# Patient Record
Sex: FEMALE | URBAN - METROPOLITAN AREA
[De-identification: names, ages, dates, MRNs, and addresses within clinical notes are randomized per-mention and may not be internally consistent; named-entity substitution may affect disease eponyms.]

---

## 2021-12-21 ENCOUNTER — APPOINTMENT (RX ONLY)
Dept: URBAN - METROPOLITAN AREA CLINIC 156 | Facility: CLINIC | Age: 66
Setting detail: DERMATOLOGY
End: 2021-12-21

## 2021-12-21 DIAGNOSIS — L85.3 XEROSIS CUTIS: ICD-10-CM

## 2021-12-21 DIAGNOSIS — L65.0 TELOGEN EFFLUVIUM: ICD-10-CM

## 2021-12-21 DIAGNOSIS — K14.1 GEOGRAPHIC TONGUE: ICD-10-CM

## 2021-12-21 PROCEDURE — ? COUNSELING

## 2021-12-21 PROCEDURE — ? PRESCRIPTION

## 2021-12-21 PROCEDURE — ? ORDER TESTS

## 2021-12-21 PROCEDURE — 99203 OFFICE O/P NEW LOW 30 MIN: CPT

## 2021-12-21 RX ORDER — CLOTRIMAZOLE 10 MG/1
LOZENGE ORAL; TOPICAL
Qty: 70 | Refills: 0 | Status: ERX | COMMUNITY
Start: 2021-12-21

## 2021-12-21 RX ADMIN — CLOTRIMAZOLE: 10 LOZENGE ORAL; TOPICAL at 00:00

## 2021-12-21 ASSESSMENT — LOCATION SIMPLE DESCRIPTION DERM
LOCATION SIMPLE: LEFT LATERAL TONGUE
LOCATION SIMPLE: RIGHT FOREHEAD
LOCATION SIMPLE: RIGHT ELBOW
LOCATION SIMPLE: LEFT ELBOW

## 2021-12-21 ASSESSMENT — LOCATION DETAILED DESCRIPTION DERM
LOCATION DETAILED: LEFT ELBOW
LOCATION DETAILED: RIGHT ELBOW
LOCATION DETAILED: RIGHT SUPERIOR MEDIAL FOREHEAD
LOCATION DETAILED: LEFT LATERAL TONGUE

## 2021-12-21 ASSESSMENT — LOCATION ZONE DERM
LOCATION ZONE: MUCOUS_MEMBRANE
LOCATION ZONE: FACE
LOCATION ZONE: ARM

## 2021-12-21 NOTE — PROCEDURE: ORDER TESTS
Bill For Surgical Tray: no
Performing Laboratory: [id number]
Lab Facility: [id number]
Billing Type: Third-Party Bill
Expected Date Of Service: 12/23/2021

## 2021-12-30 ENCOUNTER — RX ONLY (OUTPATIENT)
Age: 66
Setting detail: RX ONLY
End: 2021-12-30

## 2021-12-30 RX ORDER — TRIAMCINOLONE ACETONIDE 1 MG/G
PASTE DENTAL
Qty: 5 | Refills: 2 | Status: ERX | COMMUNITY
Start: 2021-12-30

## 2022-03-01 ENCOUNTER — APPOINTMENT (RX ONLY)
Dept: URBAN - METROPOLITAN AREA CLINIC 156 | Facility: CLINIC | Age: 67
Setting detail: DERMATOLOGY
End: 2022-03-01

## 2022-03-01 DIAGNOSIS — K14.1 GEOGRAPHIC TONGUE: ICD-10-CM | Status: INADEQUATELY CONTROLLED

## 2022-03-01 PROCEDURE — ? COUNSELING

## 2022-03-01 PROCEDURE — 99213 OFFICE O/P EST LOW 20 MIN: CPT

## 2022-03-01 PROCEDURE — ? DIAGNOSIS COMMENT

## 2022-03-01 ASSESSMENT — LOCATION SIMPLE DESCRIPTION DERM: LOCATION SIMPLE: LEFT LATERAL TONGUE

## 2022-03-01 ASSESSMENT — LOCATION ZONE DERM: LOCATION ZONE: MUCOUS_MEMBRANE

## 2022-03-01 ASSESSMENT — LOCATION DETAILED DESCRIPTION DERM: LOCATION DETAILED: LEFT LATERAL TONGUE

## 2022-03-01 NOTE — PROCEDURE: DIAGNOSIS COMMENT
Comment: Discussed making at home tacrolimus mouth wash.  She declines right now.  After researching, I found a case of response to children’s Benadryl, swish and spit.  She wants to try this now.  Discussed referral back to periodontist for possible biopsy if fails to resolve.  Consider oral lichen planus.
Render Risk Assessment In Note?: no
Detail Level: Simple

## 2023-03-09 ENCOUNTER — APPOINTMENT (RX ONLY)
Dept: URBAN - METROPOLITAN AREA CLINIC 156 | Facility: CLINIC | Age: 68
Setting detail: DERMATOLOGY
End: 2023-03-09

## 2023-03-09 VITALS — WEIGHT: 190 LBS | HEIGHT: 64 IN

## 2023-03-09 DIAGNOSIS — L71.8 OTHER ROSACEA: ICD-10-CM

## 2023-03-09 DIAGNOSIS — L43.8 OTHER LICHEN PLANUS: ICD-10-CM

## 2023-03-09 DIAGNOSIS — B88.0 OTHER ACARIASIS: ICD-10-CM

## 2023-03-09 PROCEDURE — ? COUNSELING

## 2023-03-09 PROCEDURE — 87220 TISSUE EXAM FOR FUNGI: CPT

## 2023-03-09 PROCEDURE — ? MINERAL OIL PREP

## 2023-03-09 PROCEDURE — ? KOH PREP

## 2023-03-09 PROCEDURE — ? DIAGNOSIS COMMENT

## 2023-03-09 PROCEDURE — ? TREATMENT REGIMEN

## 2023-03-09 PROCEDURE — ? PRESCRIPTION

## 2023-03-09 PROCEDURE — 99214 OFFICE O/P EST MOD 30 MIN: CPT

## 2023-03-09 RX ORDER — IVERMECTIN 3 MG/1
TABLET ORAL
Qty: 10 | Refills: 0 | Status: ERX | COMMUNITY
Start: 2023-03-09

## 2023-03-09 RX ORDER — SULFACETAMIDE SODIUM AND SULFUR 10; 5 MG/G; MG/G
RINSE TOPICAL
Qty: 170.3 | Refills: 3 | Status: ERX | COMMUNITY
Start: 2023-03-09

## 2023-03-09 RX ADMIN — SULFACETAMIDE SODIUM AND SULFUR: 10; 5 RINSE TOPICAL at 00:00

## 2023-03-09 RX ADMIN — IVERMECTIN: 3 TABLET ORAL at 00:00

## 2023-03-09 ASSESSMENT — LOCATION DETAILED DESCRIPTION DERM
LOCATION DETAILED: LEFT CENTRAL MALAR CHEEK
LOCATION DETAILED: LEFT LATERAL TONGUE
LOCATION DETAILED: RIGHT CENTRAL MALAR CHEEK
LOCATION DETAILED: NASAL ROOT
LOCATION DETAILED: RIGHT INFERIOR CENTRAL MALAR CHEEK
LOCATION DETAILED: LEFT INFERIOR CENTRAL MALAR CHEEK
LOCATION DETAILED: INFERIOR MID FOREHEAD

## 2023-03-09 ASSESSMENT — LOCATION ZONE DERM
LOCATION ZONE: NOSE
LOCATION ZONE: FACE
LOCATION ZONE: MUCOUS_MEMBRANE

## 2023-03-09 ASSESSMENT — LOCATION SIMPLE DESCRIPTION DERM
LOCATION SIMPLE: RIGHT CHEEK
LOCATION SIMPLE: NOSE
LOCATION SIMPLE: INFERIOR FOREHEAD
LOCATION SIMPLE: LEFT LATERAL TONGUE
LOCATION SIMPLE: LEFT CHEEK

## 2023-03-09 NOTE — PROCEDURE: TREATMENT REGIMEN
Detail Level: Simple
Initiate Treatment: Otezla:\\nSig: crush 30 mg and put in 16 ounce water. Swish for 30 seconds and swallow. Use twice a day. Keep in refrigerator. Make new solution every week.
Samples Given: Otezla \\nLot: [id number]\\nEspxp : [id number]

## 2023-03-09 NOTE — HPI: RASH
What Type Of Note Output Would You Prefer (Optional)?: Standard Output
Is The Patient Presenting As Previously Scheduled?: Yes
How Severe Is Your Rash?: moderate
Is This A New Presentation, Or A Follow-Up?: Rash
Additional History: Patient states she has rosacea and she feels like her cheek are flared/ broke out

## 2023-03-09 NOTE — PROCEDURE: KOH PREP
Detail Level: Detailed
Cpt Desired: 65205
Showing: demodex
Koh Intro Text (From The.....): A KOH prep was ordered and evaluated from the
Koh Procedure Text (Tissue Harvesting Technique): A 15-blade scalpel was used to scrape the skin. The skin scrapings were placed on a glass slide, covered with a coverslip and a KOH solution was applied.

## 2023-03-09 NOTE — PROCEDURE: DIAGNOSIS COMMENT
Detail Level: Simple
Render Risk Assessment In Note?: no
Comment: Unfortunately periodontist did not biopsy.  I will treat with samples of Otezla, due to low risk and will see how she does with this regimen

## 2023-03-10 RX ORDER — SULFACETAMIDE SODIUM AND SULFUR 10; 5 MG/G; MG/G
RINSE TOPICAL
Qty: 170.3 | Refills: 3 | Status: ERX

## 2023-03-10 RX ORDER — IVERMECTIN 3 MG/1
TABLET ORAL
Qty: 10 | Refills: 0 | Status: ERX

## 2023-03-28 ENCOUNTER — APPOINTMENT (RX ONLY)
Dept: URBAN - METROPOLITAN AREA CLINIC 154 | Facility: CLINIC | Age: 68
Setting detail: DERMATOLOGY
End: 2023-03-28

## 2023-03-28 DIAGNOSIS — Z41.9 ENCOUNTER FOR PROCEDURE FOR PURPOSES OTHER THAN REMEDYING HEALTH STATE, UNSPECIFIED: ICD-10-CM

## 2023-03-28 PROCEDURE — ? COSMETIC CONSULTATION: SKIN CARE PRODUCTS AND SERVICES

## 2023-04-03 ENCOUNTER — APPOINTMENT (RX ONLY)
Dept: URBAN - METROPOLITAN AREA CLINIC 154 | Facility: CLINIC | Age: 68
Setting detail: DERMATOLOGY
End: 2023-04-03

## 2023-04-03 DIAGNOSIS — L71.8 OTHER ROSACEA: ICD-10-CM

## 2023-04-03 PROCEDURE — ? LASER VASCULAR LESION

## 2023-04-03 PROCEDURE — 17107 DSTR CUT VSC PRLF LES10-50SQ: CPT

## 2023-04-03 ASSESSMENT — LOCATION DETAILED DESCRIPTION DERM: LOCATION DETAILED: LEFT INFERIOR CENTRAL MALAR CHEEK

## 2023-04-03 ASSESSMENT — LOCATION SIMPLE DESCRIPTION DERM: LOCATION SIMPLE: LEFT CHEEK

## 2023-04-03 ASSESSMENT — LOCATION ZONE DERM: LOCATION ZONE: FACE

## 2023-04-03 NOTE — PROCEDURE: LASER VASCULAR LESION
Show Topical Anesthesia: Yes
Cryogen Time (Ms): 30
Immediate Endpoint: erythema
Passes: several
Spot Size: 15 mm X 35 mm
Consent: Written consent obtained, risks reviewed including but not limited to crusting, scabbing, blistering, scarring, darker or lighter pigmentary change, incidental hair removal, bruising, and/or incomplete removal.
Laser Type: [place] 50 CO2 Laser
External Cooling Fan Speed: 5
Medical Necessity Clause: This procedure was medically necessary because the lesions that were treated were:
Power (Vang-Leave At Zero If Unwanted): 40
Post-Care Instructions: I reviewed with the patient in detail post-care instructions. Patient should stay away from the sun and wear sun protection until treated areas are fully healed.
Laser Mode: Fractionated
Medical Necessity Information: It is in your best interest to select a reason for this procedure from the list below. All of these items fulfill various CMS LCD requirements except the new and changing color options.
Delay Time (Ms): 20
Detail Level: Zone
Post Procedure Text: Post care reviewed with patient.
Pulse Duration (Usec): 0
Pulse Duration: 20 ms
Anesthesia Type: 1% lidocaine with epinephrine
External Cooling: [place] Cryo 5
Fluence: 15
Energy(Mj-Leave At Zero If Unwanted): 500
Feathering Statement: Following the treatment the wound edges were feathered using 260mJ.
Total Pulses: 52
Wavelength: 10,600nm
Include Z78.9 (Other Specified Conditions Influencing Health Status) As An Associated Diagnosis?: No
Spot Sizes: 3mm
Laser Type: IPL
Fluence Override: 6-15
Square Area Of Lesion: 20

## 2023-04-14 ENCOUNTER — APPOINTMENT (RX ONLY)
Dept: URBAN - METROPOLITAN AREA CLINIC 154 | Facility: CLINIC | Age: 68
Setting detail: DERMATOLOGY
End: 2023-04-14

## 2023-04-14 DIAGNOSIS — Z41.9 ENCOUNTER FOR PROCEDURE FOR PURPOSES OTHER THAN REMEDYING HEALTH STATE, UNSPECIFIED: ICD-10-CM

## 2023-04-14 PROCEDURE — ? SIGNATURE HYDRAFACIAL

## 2023-04-14 NOTE — PROCEDURE: SIGNATURE HYDRAFACIAL
Procedure: Extend and Protect
Tip: Hydropeel Tip, Clear
Number Of Passes: 0
Solution Override
Procedure: Peel
Post-Care Instructions: I reviewed with the patient in detail post-care instructions. Patient should stay away from the sun and wear sun protection until treated areas are fully healed.
Solution: Activ-4
Procedure: Fusion
Tip: Hydropeel Tip, Teal
Treatment Number: 1
Solution: GlySal 7.5%
Procedure: Extraction
Comments: Sydni
Indication: skin texture
Tip: Hydropeel Tip, Blue
Solution: Beta-HD
Procedure: Exfoliation
Tip Override
Procedure: Boost
Consent: Written consent obtained, risks reviewed including but not limited to crusting, scabbing, blistering, scarring, darker or lighter pigmentary change, bruising, and/or incomplete response.
Location: face

## 2023-06-09 ENCOUNTER — APPOINTMENT (RX ONLY)
Dept: URBAN - METROPOLITAN AREA CLINIC 154 | Facility: CLINIC | Age: 68
Setting detail: DERMATOLOGY
End: 2023-06-09

## 2023-06-09 DIAGNOSIS — Z41.9 ENCOUNTER FOR PROCEDURE FOR PURPOSES OTHER THAN REMEDYING HEALTH STATE, UNSPECIFIED: ICD-10-CM

## 2023-06-09 PROCEDURE — ? SIGNATURE HYDRAFACIAL

## 2023-06-09 NOTE — PROCEDURE: SIGNATURE HYDRAFACIAL
Number Of Passes: 0
Solution: Activ-4
Tip Override
Tip: Hydropeel Tip, Clear
Procedure: Peel
Post-Care Instructions: I reviewed with the patient in detail post-care instructions. Patient should stay away from the sun and wear sun protection until treated areas are fully healed.
Consent: Written consent obtained, risks reviewed including but not limited to crusting, scabbing, blistering, scarring, darker or lighter pigmentary change, bruising, and/or incomplete response.
Procedure: Extend and Protect
Procedure: Boost
Treatment Number: 1
Indication: skin texture
Solution Override
Tip: Hydropeel Tip, Blue
Tip: Hydropeel Tip, Teal
Solution: GlySal 7.5%
Procedure: Exfoliation
Procedure: Extraction
Procedure: Fusion
Location: face
Solution: Beta-HD

## 2023-06-22 ENCOUNTER — APPOINTMENT (RX ONLY)
Dept: URBAN - METROPOLITAN AREA CLINIC 154 | Facility: CLINIC | Age: 68
Setting detail: DERMATOLOGY
End: 2023-06-22

## 2023-07-14 ENCOUNTER — APPOINTMENT (RX ONLY)
Dept: URBAN - METROPOLITAN AREA CLINIC 154 | Facility: CLINIC | Age: 68
Setting detail: DERMATOLOGY
End: 2023-07-14

## 2023-07-14 DIAGNOSIS — L71.8 OTHER ROSACEA: ICD-10-CM

## 2023-07-14 PROCEDURE — ? LASER VASCULAR LESION

## 2023-07-14 PROCEDURE — 17107 DSTR CUT VSC PRLF LES10-50SQ: CPT

## 2023-07-14 ASSESSMENT — LOCATION DETAILED DESCRIPTION DERM: LOCATION DETAILED: LEFT INFERIOR CENTRAL MALAR CHEEK

## 2023-07-14 ASSESSMENT — LOCATION SIMPLE DESCRIPTION DERM: LOCATION SIMPLE: LEFT CHEEK

## 2023-07-14 ASSESSMENT — LOCATION ZONE DERM: LOCATION ZONE: FACE

## 2023-07-14 NOTE — PROCEDURE: LASER VASCULAR LESION
Show Topical Anesthesia: Yes
Cryogen Time (Ms): 30
Immediate Endpoint: erythema
Passes: several
Spot Size: 15 mm X 35 mm
Consent: Written consent obtained, risks reviewed including but not limited to crusting, scabbing, blistering, scarring, darker or lighter pigmentary change, incidental hair removal, bruising, and/or incomplete removal.
Laser Type: [place] 50 CO2 Laser
External Cooling Fan Speed: 5
Medical Necessity Clause: This procedure was medically necessary because the lesions that were treated were:
Power (Vang-Leave At Zero If Unwanted): 40
Post-Care Instructions: I reviewed with the patient in detail post-care instructions. Patient should stay away from the sun and wear sun protection until treated areas are fully healed.
Laser Mode: Fractionated
Medical Necessity Information: It is in your best interest to select a reason for this procedure from the list below. All of these items fulfill various CMS LCD requirements except the new and changing color options.
Delay Time (Ms): 20
Detail Level: Zone
Post Procedure Text: Post care reviewed with patient.
Pulse Duration (Usec): 0
Pulse Duration: 20 ms
Anesthesia Type: 1% lidocaine with epinephrine
External Cooling: [place] Cryo 5
Fluence: 15
Energy(Mj-Leave At Zero If Unwanted): 500
Feathering Statement: Following the treatment the wound edges were feathered using 260mJ.
Total Pulses: 52
Wavelength: 10,600nm
Include Z78.9 (Other Specified Conditions Influencing Health Status) As An Associated Diagnosis?: No
Spot Sizes: 3mm
Laser Type: IPL
Fluence Override: 6-15
Square Area Of Lesion: 20

## 2023-10-20 ENCOUNTER — APPOINTMENT (RX ONLY)
Dept: URBAN - METROPOLITAN AREA CLINIC 154 | Facility: CLINIC | Age: 68
Setting detail: DERMATOLOGY
End: 2023-10-20

## 2023-10-20 DIAGNOSIS — L71.8 OTHER ROSACEA: ICD-10-CM

## 2023-10-20 PROCEDURE — ? LASER VASCULAR LESION

## 2023-10-20 PROCEDURE — 17107 DSTR CUT VSC PRLF LES10-50SQ: CPT

## 2023-10-20 ASSESSMENT — LOCATION DETAILED DESCRIPTION DERM: LOCATION DETAILED: LEFT INFERIOR CENTRAL MALAR CHEEK

## 2023-10-20 ASSESSMENT — LOCATION ZONE DERM: LOCATION ZONE: FACE

## 2023-10-20 ASSESSMENT — LOCATION SIMPLE DESCRIPTION DERM: LOCATION SIMPLE: LEFT CHEEK

## 2023-10-20 NOTE — PROCEDURE: LASER VASCULAR LESION
Show Topical Anesthesia: Yes
Cryogen Time (Ms): 30
Immediate Endpoint: erythema
Passes: several
Spot Size: 15 mm X 35 mm
Consent: Written consent obtained, risks reviewed including but not limited to crusting, scabbing, blistering, scarring, darker or lighter pigmentary change, incidental hair removal, bruising, and/or incomplete removal.
Laser Type: [place] 50 CO2 Laser
External Cooling Fan Speed: 5
Medical Necessity Clause: This procedure was medically necessary because the lesions that were treated were:
Power (Vang-Leave At Zero If Unwanted): 40
Post-Care Instructions: I reviewed with the patient in detail post-care instructions. Patient should stay away from the sun and wear sun protection until treated areas are fully healed.
Laser Mode: Fractionated
Medical Necessity Information: It is in your best interest to select a reason for this procedure from the list below. All of these items fulfill various CMS LCD requirements except the new and changing color options.
Delay Time (Ms): 20
Detail Level: Zone
Post Procedure Text: Post care reviewed with patient.
Pulse Duration (Usec): 0
Pulse Duration: 20 ms
Anesthesia Type: 1% lidocaine with epinephrine
External Cooling: [place] Cryo 5
Fluence: 15
Energy(Mj-Leave At Zero If Unwanted): 500
Feathering Statement: Following the treatment the wound edges were feathered using 260mJ.
Total Pulses: 52
Wavelength: 10,600nm
Include Z78.9 (Other Specified Conditions Influencing Health Status) As An Associated Diagnosis?: No
Spot Sizes: 3mm
Laser Type: IPL
Fluence Override: 6-15
Square Area Of Lesion: 20

## 2024-01-02 ENCOUNTER — APPOINTMENT (RX ONLY)
Dept: URBAN - METROPOLITAN AREA CLINIC 156 | Facility: CLINIC | Age: 69
Setting detail: DERMATOLOGY
End: 2024-01-02

## 2024-01-02 VITALS — HEIGHT: 64 IN | WEIGHT: 203 LBS

## 2024-01-02 DIAGNOSIS — K13.0 DISEASES OF LIPS: ICD-10-CM | Status: INADEQUATELY CONTROLLED

## 2024-01-02 PROCEDURE — ? COUNSELING

## 2024-01-02 PROCEDURE — ? TREATMENT REGIMEN

## 2024-01-02 PROCEDURE — 99213 OFFICE O/P EST LOW 20 MIN: CPT | Mod: 24

## 2024-01-02 ASSESSMENT — LOCATION ZONE DERM: LOCATION ZONE: LIP

## 2024-01-02 ASSESSMENT — LOCATION SIMPLE DESCRIPTION DERM: LOCATION SIMPLE: RIGHT LIP

## 2024-01-02 ASSESSMENT — LOCATION DETAILED DESCRIPTION DERM: LOCATION DETAILED: RIGHT ORAL COMMISSURE

## 2024-02-29 ENCOUNTER — APPOINTMENT (RX ONLY)
Dept: URBAN - METROPOLITAN AREA CLINIC 154 | Facility: CLINIC | Age: 69
Setting detail: DERMATOLOGY
End: 2024-02-29

## 2024-02-29 DIAGNOSIS — L71.8 OTHER ROSACEA: ICD-10-CM

## 2024-02-29 PROCEDURE — ? LASER VASCULAR LESION

## 2024-02-29 PROCEDURE — 17107 DSTR CUT VSC PRLF LES10-50SQ: CPT

## 2024-02-29 ASSESSMENT — LOCATION SIMPLE DESCRIPTION DERM: LOCATION SIMPLE: LEFT CHEEK

## 2024-02-29 ASSESSMENT — LOCATION ZONE DERM: LOCATION ZONE: FACE

## 2024-02-29 ASSESSMENT — LOCATION DETAILED DESCRIPTION DERM: LOCATION DETAILED: LEFT INFERIOR CENTRAL MALAR CHEEK

## 2024-02-29 NOTE — PROCEDURE: LASER VASCULAR LESION
Show Topical Anesthesia: Yes
Cryogen Time (Ms): 30
Immediate Endpoint: erythema
Passes: several
Spot Size: 15 mm X 35 mm
Consent: Written consent obtained, risks reviewed including but not limited to crusting, scabbing, blistering, scarring, darker or lighter pigmentary change, incidental hair removal, bruising, and/or incomplete removal.
Co2 Laser Type: [place] 50 CO2 Laser
External Cooling Fan Speed: 5
Medical Necessity Clause: This procedure was medically necessary because the lesions that were treated were:
Power (Vang-Leave At Zero If Unwanted): 40
Post-Care Instructions: I reviewed with the patient in detail post-care instructions. Patient should stay away from the sun and wear sun protection until treated areas are fully healed.
Laser Mode: Fractionated
Medical Necessity Information: It is in your best interest to select a reason for this procedure from the list below. All of these items fulfill various CMS LCD requirements except the new and changing color options.
Delay Time (Ms): 20
Detail Level: Zone
Post Procedure Text: Post care reviewed with patient.
Pulse Duration (Usec): 0
Pulse Duration: 20 ms
Anesthesia Type: 1% lidocaine with epinephrine
External Cooling: [place] Cryo 5
Fluence: 15
Energy(Mj-Leave At Zero If Unwanted): 500
Feathering Statement: Following the treatment the wound edges were feathered using 260mJ.
Total Pulses: 52
Wavelength: 10,600nm
Include Z78.9 (Other Specified Conditions Influencing Health Status) As An Associated Diagnosis?: No
Spot Sizes: 3mm
Laser Type: IPL
Fluence Override: 6-15

## 2024-06-13 ENCOUNTER — APPOINTMENT (RX ONLY)
Dept: URBAN - METROPOLITAN AREA CLINIC 154 | Facility: CLINIC | Age: 69
Setting detail: DERMATOLOGY
End: 2024-06-13

## 2024-06-13 DIAGNOSIS — L71.8 OTHER ROSACEA: ICD-10-CM

## 2024-06-13 PROCEDURE — ? LASER VASCULAR LESION

## 2024-06-13 PROCEDURE — 17107 DSTR CUT VSC PRLF LES10-50SQ: CPT

## 2024-06-13 ASSESSMENT — LOCATION SIMPLE DESCRIPTION DERM: LOCATION SIMPLE: LEFT CHEEK

## 2024-06-13 ASSESSMENT — LOCATION ZONE DERM: LOCATION ZONE: FACE

## 2024-06-13 ASSESSMENT — LOCATION DETAILED DESCRIPTION DERM: LOCATION DETAILED: LEFT INFERIOR CENTRAL MALAR CHEEK

## 2024-06-13 NOTE — PROCEDURE: LASER VASCULAR LESION
Show Topical Anesthesia: Yes
Cryogen Time (Ms): 30
Immediate Endpoint: erythema
Passes: several
Spot Size: 15 mm X 35 mm
Consent: Written consent obtained, risks reviewed including but not limited to crusting, scabbing, blistering, scarring, darker or lighter pigmentary change, incidental hair removal, bruising, and/or incomplete removal.
Co2 Laser Type: [place] 50 CO2 Laser
External Cooling Fan Speed: 5
Medical Necessity Clause: This procedure was medically necessary because the lesions that were treated were:
Power (Vang-Leave At Zero If Unwanted): 40
Post-Care Instructions: I reviewed with the patient in detail post-care instructions. Patient should stay away from the sun and wear sun protection until treated areas are fully healed.
Laser Mode: Fractionated
Medical Necessity Information: It is in your best interest to select a reason for this procedure from the list below. All of these items fulfill various CMS LCD requirements except the new and changing color options.
Delay Time (Ms): 20
Detail Level: Zone
Post Procedure Text: Post care reviewed with patient.
Pulse Duration (Usec): 0
Pulse Duration: 20 ms
Anesthesia Type: 1% lidocaine with epinephrine
External Cooling: [place] Cryo 5
Fluence: 15
Energy(Mj-Leave At Zero If Unwanted): 500
Feathering Statement: Following the treatment the wound edges were feathered using 260mJ.
Total Pulses: 52
Wavelength: 10,600nm
Include Z78.9 (Other Specified Conditions Influencing Health Status) As An Associated Diagnosis?: No
Spot Sizes: 3mm
Laser Type: IPL
Fluence Override: 6-15

## 2024-10-04 ENCOUNTER — APPOINTMENT (RX ONLY)
Dept: URBAN - METROPOLITAN AREA CLINIC 154 | Facility: CLINIC | Age: 69
Setting detail: DERMATOLOGY
End: 2024-10-04

## 2024-10-04 DIAGNOSIS — L71.8 OTHER ROSACEA: ICD-10-CM

## 2024-10-04 PROCEDURE — 17107 DSTR CUT VSC PRLF LES10-50SQ: CPT

## 2024-10-04 PROCEDURE — ? LASER VASCULAR LESION

## 2024-10-04 ASSESSMENT — LOCATION DETAILED DESCRIPTION DERM: LOCATION DETAILED: LEFT INFERIOR CENTRAL MALAR CHEEK

## 2024-10-04 ASSESSMENT — LOCATION SIMPLE DESCRIPTION DERM: LOCATION SIMPLE: LEFT CHEEK

## 2024-10-04 ASSESSMENT — LOCATION ZONE DERM: LOCATION ZONE: FACE

## 2024-10-04 NOTE — PROCEDURE: LASER VASCULAR LESION
Show Topical Anesthesia: Yes
Cryogen Time (Ms): 30
Immediate Endpoint: erythema
Passes: several
Spot Size: 15 mm X 35 mm
Consent: Written consent obtained, risks reviewed including but not limited to crusting, scabbing, blistering, scarring, darker or lighter pigmentary change, incidental hair removal, bruising, and/or incomplete removal.
Co2 Laser Type: [place] 50 CO2 Laser
External Cooling Fan Speed: 5
Medical Necessity Clause: This procedure was medically necessary because the lesions that were treated were:
Power (Vang-Leave At Zero If Unwanted): 40
Post-Care Instructions: I reviewed with the patient in detail post-care instructions. Patient should stay away from the sun and wear sun protection until treated areas are fully healed.
Laser Mode: Fractionated
Medical Necessity Information: It is in your best interest to select a reason for this procedure from the list below. All of these items fulfill various CMS LCD requirements except the new and changing color options.
Delay Time (Ms): 20
Detail Level: Zone
Post Procedure Text: Post care reviewed with patient.
Pulse Duration (Usec): 0
Pulse Duration: 20 ms
Anesthesia Type: 1% lidocaine with epinephrine
External Cooling: [place] Cryo 5
Fluence: 15
Energy(Mj-Leave At Zero If Unwanted): 500
Feathering Statement: Following the treatment the wound edges were feathered using 260mJ.
Total Pulses: 52
Wavelength: 10,600nm
Include Z78.9 (Other Specified Conditions Influencing Health Status) As An Associated Diagnosis?: No
Spot Sizes: 3mm
Laser Type: IPL
Fluence Override: 6-15

## 2025-01-10 ENCOUNTER — APPOINTMENT (OUTPATIENT)
Dept: URBAN - METROPOLITAN AREA CLINIC 154 | Facility: CLINIC | Age: 70
Setting detail: DERMATOLOGY
End: 2025-01-10

## 2025-01-10 DIAGNOSIS — L71.8 OTHER ROSACEA: ICD-10-CM

## 2025-01-10 PROCEDURE — ? LASER VASCULAR LESION

## 2025-01-10 PROCEDURE — 17107 DSTR CUT VSC PRLF LES10-50SQ: CPT

## 2025-01-10 ASSESSMENT — LOCATION DETAILED DESCRIPTION DERM: LOCATION DETAILED: LEFT INFERIOR CENTRAL MALAR CHEEK

## 2025-01-10 ASSESSMENT — LOCATION SIMPLE DESCRIPTION DERM: LOCATION SIMPLE: LEFT CHEEK

## 2025-01-10 ASSESSMENT — LOCATION ZONE DERM: LOCATION ZONE: FACE

## 2025-01-10 NOTE — PROCEDURE: LASER VASCULAR LESION
Show Topical Anesthesia: Yes
Cryogen Time (Ms): 30
Immediate Endpoint: erythema
Passes: several
Spot Size: 15 mm X 35 mm
Consent: Written consent obtained, risks reviewed including but not limited to crusting, scabbing, blistering, scarring, darker or lighter pigmentary change, incidental hair removal, bruising, and/or incomplete removal.
Co2 Laser Type: [place] 50 CO2 Laser
External Cooling Fan Speed: 5
Medical Necessity Clause: This procedure was medically necessary because the lesions that were treated were:
Power (Vang-Leave At Zero If Unwanted): 40
Post-Care Instructions: I reviewed with the patient in detail post-care instructions. Patient should stay away from the sun and wear sun protection until treated areas are fully healed.
Laser Mode: Fractionated
Medical Necessity Information: It is in your best interest to select a reason for this procedure from the list below. All of these items fulfill various CMS LCD requirements except the new and changing color options.
Delay Time (Ms): 20
Detail Level: Zone
Post Procedure Text: Post care reviewed with patient.
Pulse Duration (Usec): 0
Pulse Duration: 20 ms
Anesthesia Type: 1% lidocaine with epinephrine
External Cooling: [place] Cryo 5
Fluence: 15
Energy(Mj-Leave At Zero If Unwanted): 500
Feathering Statement: Following the treatment the wound edges were feathered using 260mJ.
Total Pulses: 52
Wavelength: 10,600nm
Include Z78.9 (Other Specified Conditions Influencing Health Status) As An Associated Diagnosis?: No
Spot Sizes: 3mm
Laser Type: IPL
Fluence Override: 6-15

## 2025-04-25 ENCOUNTER — APPOINTMENT (OUTPATIENT)
Dept: URBAN - METROPOLITAN AREA CLINIC 154 | Facility: CLINIC | Age: 70
Setting detail: DERMATOLOGY
End: 2025-04-25

## 2025-04-25 DIAGNOSIS — L71.8 OTHER ROSACEA: ICD-10-CM

## 2025-04-25 PROCEDURE — ? LASER VASCULAR LESION

## 2025-04-25 PROCEDURE — 17107 DSTR CUT VSC PRLF LES10-50SQ: CPT

## 2025-04-25 ASSESSMENT — LOCATION ZONE DERM: LOCATION ZONE: FACE

## 2025-04-25 ASSESSMENT — LOCATION DETAILED DESCRIPTION DERM: LOCATION DETAILED: LEFT INFERIOR CENTRAL MALAR CHEEK

## 2025-04-25 ASSESSMENT — LOCATION SIMPLE DESCRIPTION DERM: LOCATION SIMPLE: LEFT CHEEK

## 2025-04-25 NOTE — PROCEDURE: LASER VASCULAR LESION
Show Topical Anesthesia: Yes
Cryogen Time (Ms): 30
Immediate Endpoint: erythema
Passes: several
Spot Size: 15 mm X 35 mm
Consent: Written consent obtained, risks reviewed including but not limited to crusting, scabbing, blistering, scarring, darker or lighter pigmentary change, incidental hair removal, bruising, and/or incomplete removal.
Co2 Laser Type: [place] 50 CO2 Laser
External Cooling Fan Speed: 5
Medical Necessity Clause: This procedure was medically necessary because the lesions that were treated were:
Power (Vang-Leave At Zero If Unwanted): 40
Post-Care Instructions: I reviewed with the patient in detail post-care instructions. Patient should stay away from the sun and wear sun protection until treated areas are fully healed.
Laser Mode: Fractionated
Medical Necessity Information: It is in your best interest to select a reason for this procedure from the list below. All of these items fulfill various CMS LCD requirements except the new and changing color options.
Delay Time (Ms): 20
Detail Level: Zone
Post Procedure Text: Post care reviewed with patient.
Pulse Duration (Usec): 0
Pulse Duration: 20 ms
Anesthesia Type: 1% lidocaine with epinephrine
External Cooling: [place] Cryo 5
Fluence: 15
Energy(Mj-Leave At Zero If Unwanted): 500
Feathering Statement: Following the treatment the wound edges were feathered using 260mJ.
Total Pulses: 52
Wavelength: 10,600nm
Include Z78.9 (Other Specified Conditions Influencing Health Status) As An Associated Diagnosis?: No
Spot Sizes: 3mm
Laser Type: IPL
Fluence Override: 6-15

## 2025-08-15 ENCOUNTER — APPOINTMENT (OUTPATIENT)
Dept: URBAN - METROPOLITAN AREA CLINIC 154 | Facility: CLINIC | Age: 70
Setting detail: DERMATOLOGY
End: 2025-08-15

## 2025-08-15 DIAGNOSIS — L71.8 OTHER ROSACEA: ICD-10-CM

## 2025-08-15 PROCEDURE — ? LASER VASCULAR LESION

## 2025-08-15 ASSESSMENT — LOCATION SIMPLE DESCRIPTION DERM: LOCATION SIMPLE: LEFT CHEEK

## 2025-08-15 ASSESSMENT — LOCATION ZONE DERM: LOCATION ZONE: FACE

## 2025-08-15 ASSESSMENT — LOCATION DETAILED DESCRIPTION DERM: LOCATION DETAILED: LEFT INFERIOR CENTRAL MALAR CHEEK
